# Patient Record
Sex: FEMALE
[De-identification: names, ages, dates, MRNs, and addresses within clinical notes are randomized per-mention and may not be internally consistent; named-entity substitution may affect disease eponyms.]

---

## 2019-01-10 ENCOUNTER — RESULT REVIEW (OUTPATIENT)
Age: 24
End: 2019-01-10

## 2020-01-31 ENCOUNTER — RESULT REVIEW (OUTPATIENT)
Age: 25
End: 2020-01-31

## 2021-04-29 ENCOUNTER — RESULT REVIEW (OUTPATIENT)
Age: 26
End: 2021-04-29

## 2021-05-13 ENCOUNTER — RESULT REVIEW (OUTPATIENT)
Age: 26
End: 2021-05-13

## 2021-05-27 ENCOUNTER — NON-APPOINTMENT (OUTPATIENT)
Age: 26
End: 2021-05-27

## 2021-05-27 PROBLEM — Z00.00 ENCOUNTER FOR PREVENTIVE HEALTH EXAMINATION: Status: ACTIVE | Noted: 2021-05-27

## 2021-06-03 ENCOUNTER — NON-APPOINTMENT (OUTPATIENT)
Age: 26
End: 2021-06-03

## 2021-06-04 ENCOUNTER — APPOINTMENT (OUTPATIENT)
Dept: GYNECOLOGIC ONCOLOGY | Facility: CLINIC | Age: 26
End: 2021-06-04
Payer: COMMERCIAL

## 2021-06-04 VITALS
BODY MASS INDEX: 23.39 KG/M2 | HEART RATE: 73 BPM | OXYGEN SATURATION: 96 % | TEMPERATURE: 97.2 F | HEIGHT: 67 IN | WEIGHT: 149 LBS | SYSTOLIC BLOOD PRESSURE: 123 MMHG | RESPIRATION RATE: 17 BRPM | DIASTOLIC BLOOD PRESSURE: 81 MMHG

## 2021-06-04 DIAGNOSIS — Z78.9 OTHER SPECIFIED HEALTH STATUS: ICD-10-CM

## 2021-06-04 PROCEDURE — 99072 ADDL SUPL MATRL&STAF TM PHE: CPT

## 2021-06-04 PROCEDURE — 99204 OFFICE O/P NEW MOD 45 MIN: CPT

## 2021-07-06 PROBLEM — Z78.9 NON-SMOKER: Status: ACTIVE | Noted: 2021-07-06

## 2021-07-06 PROBLEM — Z78.9 PATIENT DENIES MEDICAL PROBLEMS: Status: RESOLVED | Noted: 2021-07-06 | Resolved: 2021-07-06

## 2021-07-06 NOTE — PAST MEDICAL HISTORY
[Menstruating] : The patient is menstruating [Menarche Age ____] : age at menarche was [unfilled] [Approximately ___] : the LMP was approximately [unfilled] [Total Preg ___] : G[unfilled]

## 2021-07-06 NOTE — DISCUSSION/SUMMARY
[FreeTextEntry1] : With the aid of diagrams we reviewed the findings in detail.  We reviewed HPV its pathogenesis and the implications of an abnormal cervical cytology and the pathogenesis of dysplasia in detail.ASCUS with high-risk HPV is associated with 4% of Pap smears.\par \par It has been reported that 40 to 58 percent of OSEIL 2 lesions will regress if left untreated, while 22 percent progress to OSIEL 3, and 5 percent progress to invasive cancer. ASCCP guidelines were reviewed with the patient. Excision procedure is recommended for both OSIEL 2 and CIN3. \par \par The risks and benefits of LEEP vs. CKC were discussed which include, but are not limited to: bleeding, infection, cervical stenosis, cervical insufficiency. Possibility of needing a repeat procedure or hysterectomy was also discussed. I also discussed the possibility that no abnormality will be seen on the LEEP specimen.\par \par Office LEEP procedure is recommended in this case.\par

## 2021-07-06 NOTE — HISTORY OF PRESENT ILLNESS
[FreeTextEntry1] : Problem\par 1) High grade cervical dysplasia\par \par Previous Therapy\par 1) pap  4/29/21\par    a) ASCUS\par 2) Colposcopy 5/13/21\par    a) Cervix 9, 12 CIN2/3\par    b) Cervix 6, ECC wnl\par \par 27 yo referred by Dr. Umanzor for cervical dysplasia. Patient is feeling well.

## 2021-07-29 ENCOUNTER — APPOINTMENT (OUTPATIENT)
Dept: GYNECOLOGIC ONCOLOGY | Facility: CLINIC | Age: 26
End: 2021-07-29
Payer: COMMERCIAL

## 2021-07-29 ENCOUNTER — TRANSCRIPTION ENCOUNTER (OUTPATIENT)
Age: 26
End: 2021-07-29

## 2021-07-29 VITALS
WEIGHT: 147 LBS | SYSTOLIC BLOOD PRESSURE: 129 MMHG | TEMPERATURE: 96.8 F | DIASTOLIC BLOOD PRESSURE: 77 MMHG | OXYGEN SATURATION: 97 % | BODY MASS INDEX: 23.07 KG/M2 | HEIGHT: 67 IN | HEART RATE: 86 BPM

## 2021-07-29 PROCEDURE — 99213 OFFICE O/P EST LOW 20 MIN: CPT

## 2021-07-29 PROCEDURE — 99072 ADDL SUPL MATRL&STAF TM PHE: CPT

## 2021-07-29 NOTE — DISCUSSION/SUMMARY
[FreeTextEntry1] : Long discussion with patient regarding biopsy results, cancer risks and different management.\par \par Technically, per ASCCP management given her CIN2/3 disease she should undergo LEEP at this time\par However, given patient would be due for 6 month surveillance  now in 3 months, I think it is reasonable to wait 3 months. At that time I will repeat pap/hpv and colposcopy with biopsies\par If at that point there is any residual CIN3, we will move forward with LEEP\par If dysplasia has regressed to CIN2 we will continue observation\par All questions answered to patient's apparent satisfaction\par Appointment made for follow up in October to determine plan of care

## 2021-10-07 ENCOUNTER — APPOINTMENT (OUTPATIENT)
Dept: GYNECOLOGIC ONCOLOGY | Facility: CLINIC | Age: 26
End: 2021-10-07
Payer: COMMERCIAL

## 2021-10-07 ENCOUNTER — LABORATORY RESULT (OUTPATIENT)
Age: 26
End: 2021-10-07

## 2021-10-07 VITALS
BODY MASS INDEX: 21.97 KG/M2 | DIASTOLIC BLOOD PRESSURE: 67 MMHG | HEIGHT: 67 IN | WEIGHT: 140 LBS | HEART RATE: 97 BPM | TEMPERATURE: 96.7 F | SYSTOLIC BLOOD PRESSURE: 102 MMHG | OXYGEN SATURATION: 97 %

## 2021-10-07 PROCEDURE — 57454 BX/CURETT OF CERVIX W/SCOPE: CPT

## 2021-10-07 PROCEDURE — 99072 ADDL SUPL MATRL&STAF TM PHE: CPT

## 2021-10-07 NOTE — DISCUSSION/SUMMARY
[FreeTextEntry1] : Pap/HPV and colposcopy with biopsies repeated today\par Will call patient with results to determine plan of care (LEEP vs observation)\par Patient will monitor menses, if none in 3 months will induce with progesterone\par

## 2021-10-07 NOTE — HISTORY OF PRESENT ILLNESS
[FreeTextEntry1] : Problem\par 1) High grade cervical dysplasia\par \par Previous Therapy\par 1) pap  4/29/21\par    a) ASCUS\par 2) Colposcopy 5/13/21\par    a) Cervix 9, 12 CIN2/3\par    b) Cervix 6, ECC wnl\par \par \par Here today for follow up. Feeling well. Stopped her Junel about a month ago, has not had a period yet. Not sexually active currently.

## 2021-10-07 NOTE — PROCEDURE
[Colposcopy] : colposcopy [HGSIL] : HGSIL [Patient] : the patient [Verbal Consent] : verbal consent was obtained prior to the procedure and is detailed in the patient's record [SCJ Fully Visualized] : the squamocolumnar junction was fully visualized [No Abnormalities] : no abnormalities [Acetowhite ___ o'clock] : ascetowhite changes at the [unfilled] ~Uo'clock position [Atypical Vessels ___ o'clock] : no atypical vessels [Punctation ___ o'clock] : no punctation [Mosaicism ___ o'clock] : no mosaicism [Biopsies Taken: # ___] : [unfilled] biopsies taken of the cervix [ECC Done] : an Endocervical curettage was performed.  [Biopsy Locations ___ o'clock] : the biopsies were taken at the [unfilled] o'clock position

## 2021-10-07 NOTE — PHYSICAL EXAM
[Normal] : Recto-Vaginal Exam: Normal [Fully active, able to carry on all pre-disease performance without restriction] : Status 0 - Fully active, able to carry on all pre-disease performance without restriction Priti

## 2021-10-08 LAB — HPV HIGH+LOW RISK DNA PNL CVX: DETECTED

## 2021-10-14 LAB — CORE LAB BIOPSY: NORMAL

## 2021-10-18 ENCOUNTER — NON-APPOINTMENT (OUTPATIENT)
Age: 26
End: 2021-10-18

## 2022-04-14 ENCOUNTER — TRANSCRIPTION ENCOUNTER (OUTPATIENT)
Age: 27
End: 2022-04-14

## 2022-05-26 ENCOUNTER — APPOINTMENT (OUTPATIENT)
Dept: GYNECOLOGIC ONCOLOGY | Facility: CLINIC | Age: 27
End: 2022-05-26
Payer: COMMERCIAL

## 2022-05-26 ENCOUNTER — LABORATORY RESULT (OUTPATIENT)
Age: 27
End: 2022-05-26

## 2022-05-26 VITALS
SYSTOLIC BLOOD PRESSURE: 110 MMHG | DIASTOLIC BLOOD PRESSURE: 73 MMHG | WEIGHT: 145 LBS | HEIGHT: 67 IN | TEMPERATURE: 97.6 F | OXYGEN SATURATION: 96 % | HEART RATE: 82 BPM | BODY MASS INDEX: 22.76 KG/M2

## 2022-05-26 LAB
HCG UR QL: NEGATIVE
QUALITY CONTROL: YES

## 2022-05-26 PROCEDURE — 57454 BX/CURETT OF CERVIX W/SCOPE: CPT

## 2022-05-26 PROCEDURE — 81025 URINE PREGNANCY TEST: CPT

## 2022-05-26 NOTE — HISTORY OF PRESENT ILLNESS
[FreeTextEntry1] : Problem\par 1) High grade cervical dysplasia\par \par Previous Therapy\par 1) pap  4/29/21\par    a) ASCUS\par 2) Colposcopy 5/13/21\par    a) Cervix 9, 12 CIN2/3\par    b) Cervix 6, ECC wnl\par 3) Pap & Colpo 10/2021\par    a) pap LSIL,. HPV 16+\par    b) Colposcopy 12 o clock with atypia, ECC insufficient. \par \par \par Here today for follow up. Feeling well. Not menstruating on Junel. No abnormal bleeding or pelvic pain.

## 2022-05-26 NOTE — DISCUSSION/SUMMARY
[FreeTextEntry1] : History of CIN2 untreated on surveillance. \par Pap/HPV and colposcopy with biopsies repeated today\par Will call patient with results to determine plan of care (LEEP vs observation)\par \par

## 2022-05-31 LAB — HPV HIGH+LOW RISK DNA PNL CVX: DETECTED

## 2022-06-01 ENCOUNTER — NON-APPOINTMENT (OUTPATIENT)
Age: 27
End: 2022-06-01

## 2022-06-01 LAB
CORE LAB BIOPSY: NORMAL
CYTOLOGY CVX/VAG DOC THIN PREP: ABNORMAL

## 2022-08-22 ENCOUNTER — OUTPATIENT (OUTPATIENT)
Dept: OUTPATIENT SERVICES | Facility: HOSPITAL | Age: 27
LOS: 1 days | End: 2022-08-22

## 2022-08-22 ENCOUNTER — NON-APPOINTMENT (OUTPATIENT)
Age: 27
End: 2022-08-22

## 2022-08-22 ENCOUNTER — RESULT REVIEW (OUTPATIENT)
Age: 27
End: 2022-08-22

## 2022-08-22 ENCOUNTER — APPOINTMENT (OUTPATIENT)
Dept: ULTRASOUND IMAGING | Facility: CLINIC | Age: 27
End: 2022-08-22

## 2022-08-22 PROCEDURE — 76856 US EXAM PELVIC COMPLETE: CPT | Mod: 26

## 2022-08-22 PROCEDURE — 76830 TRANSVAGINAL US NON-OB: CPT | Mod: 26

## 2022-08-25 ENCOUNTER — APPOINTMENT (OUTPATIENT)
Dept: GYNECOLOGIC ONCOLOGY | Facility: CLINIC | Age: 27
End: 2022-08-25

## 2022-08-25 VITALS
DIASTOLIC BLOOD PRESSURE: 75 MMHG | HEIGHT: 67 IN | OXYGEN SATURATION: 98 % | SYSTOLIC BLOOD PRESSURE: 115 MMHG | BODY MASS INDEX: 21.19 KG/M2 | WEIGHT: 135 LBS | RESPIRATION RATE: 18 BRPM | HEART RATE: 74 BPM | TEMPERATURE: 97.4 F

## 2022-08-25 DIAGNOSIS — R10.2 PELVIC AND PERINEAL PAIN: ICD-10-CM

## 2022-08-25 PROCEDURE — 99213 OFFICE O/P EST LOW 20 MIN: CPT

## 2022-09-06 ENCOUNTER — TRANSCRIPTION ENCOUNTER (OUTPATIENT)
Age: 27
End: 2022-09-06

## 2022-09-06 PROBLEM — R10.2 PELVIC PAIN: Status: ACTIVE | Noted: 2022-08-22

## 2022-09-06 LAB
FSH SERPL-MCNC: 6.8 IU/L
LH SERPL-ACNC: 2.3 IU/L

## 2022-09-06 NOTE — PROCEDURE
[Colposcopy] : colposcopy [HGSIL] : HGSIL [Patient] : the patient [Verbal Consent] : verbal consent was obtained prior to the procedure and is detailed in the patient's record [SCJ Fully Visualized] : the squamocolumnar junction was fully visualized [No Abnormalities] : no abnormalities [Acetowhite ___ o'clock] : ascetowhite changes at the [unfilled] ~Uo'clock position [Biopsies Taken: # ___] : [unfilled] biopsies taken of the cervix [ECC Done] : an Endocervical curettage was performed.  [Biopsy Locations ___ o'clock] : the biopsies were taken at the [unfilled] o'clock position [Atypical Vessels ___ o'clock] : no atypical vessels [Punctation ___ o'clock] : no punctation [Mosaicism ___ o'clock] : no mosaicism

## 2022-09-06 NOTE — DISCUSSION/SUMMARY
[FreeTextEntry1] : Bloating, normal pelvic US  - patient to follow symptoms and continue GI work up\par Small ovaries - draw FSH, LH, estradiol \par History of CIN2 untreated on surveillance. \par Repeat pap/colposcopy due 12/2022\par \par

## 2022-09-06 NOTE — HISTORY OF PRESENT ILLNESS
[FreeTextEntry1] : Problem\par 1) High grade cervical dysplasia\par \par Previous Therapy\par 1) pap  4/29/21\par    a) ASCUS\par 2) Colposcopy 5/13/21\par    a) Cervix 9, 12 CIN2/3\par    b) Cervix 6, ECC wnl\par 3) Pap & Colpo 10/2021\par    a) pap LSIL,. HPV 16+\par    b) Colposcopy 12 o clock with atypia, ECC insufficient. '\par 4) 6/2022 Testing\par     a) Pap neg, HPV+ non 16/18 colposcopy wnl 12, 9, ECC\par 5) Pelvic US 8/22/22\par    a) wnl - small ovaries bilaterally \par \par \par Here today for follow up. Having bloating. About 1 month ago noticed significant bloating. had changed to a high fiber plant based diet and was newly constipated. Changed diet and took laxatives and had relief but still feels bloated. Saw GI doctor and planning H Pylori breath test. bowel movements have normalized. Decreased appetite. Doesn't menstruate on Junel, on placebo week now.

## 2022-11-16 ENCOUNTER — NON-APPOINTMENT (OUTPATIENT)
Age: 27
End: 2022-11-16

## 2023-03-22 ENCOUNTER — NON-APPOINTMENT (OUTPATIENT)
Age: 28
End: 2023-03-22

## 2023-03-28 ENCOUNTER — APPOINTMENT (OUTPATIENT)
Dept: GYNECOLOGIC ONCOLOGY | Facility: CLINIC | Age: 28
End: 2023-03-28
Payer: COMMERCIAL

## 2023-03-28 VITALS
SYSTOLIC BLOOD PRESSURE: 107 MMHG | HEIGHT: 67 IN | HEART RATE: 61 BPM | TEMPERATURE: 97.6 F | OXYGEN SATURATION: 99 % | DIASTOLIC BLOOD PRESSURE: 70 MMHG | BODY MASS INDEX: 21.19 KG/M2 | WEIGHT: 135 LBS

## 2023-03-28 LAB
HCG UR QL: NEGATIVE
QUALITY CONTROL: YES

## 2023-03-28 PROCEDURE — 81025 URINE PREGNANCY TEST: CPT

## 2023-03-28 PROCEDURE — 57454 BX/CURETT OF CERVIX W/SCOPE: CPT

## 2023-03-28 PROCEDURE — 99212 OFFICE O/P EST SF 10 MIN: CPT | Mod: 25

## 2023-03-28 NOTE — HISTORY OF PRESENT ILLNESS
[FreeTextEntry1] : Problem\par 1) High grade cervical dysplasia\par \par Previous Therapy\par 1) pap  4/29/21\par    a) ASCUS\par 2) Colposcopy 5/13/21\par    a) Cervix 9, 12 CIN2/3\par    b) Cervix 6, ECC wnl\par 3) Pap & Colpo 10/2021\par    a) pap LSIL,. HPV 16+\par    b) Colposcopy 12 o clock with atypia, ECC insufficient. '\par 4) 6/2022 Testing\par     a) Pap neg, HPV+ non 16/18 colposcopy wnl 12, 9, ECC\par 5) Pelvic US 8/22/22\par    a) wnl - small ovaries bilaterally \par \par Here today for repeat pap/hpv and colpo. No HGCIN since 5/2021. Currently on metronidazole for BV - was initially diagnosed with yeast infection but vaginitis panel revealed BV. GC/CT test same day negative as well. No further bloating.

## 2023-03-28 NOTE — DISCUSSION/SUMMARY
[FreeTextEntry1] : \par History of CIN2 untreated on surveillance - no hGCIN since 5/2021. \par Repeat pap/colposcopy done today. If normal can return to annual pap test. \par STI testing ordered for 1 month post exposure\par Will call with results to determine plan of care\par \par

## 2023-03-28 NOTE — PHYSICAL EXAM
[Chaperone Present] : A chaperone was present in the examining room during all aspects of the physical examination [Normal] : Anus and perineum: Normal sphincter tone, no masses, no prolapse. [Fully active, able to carry on all pre-disease performance without restriction] : Status 0 - Fully active, able to carry on all pre-disease performance without restriction

## 2023-04-04 LAB — CORE LAB BIOPSY: NORMAL

## 2023-04-05 ENCOUNTER — TRANSCRIPTION ENCOUNTER (OUTPATIENT)
Age: 28
End: 2023-04-05

## 2023-04-05 LAB — CYTOLOGY CVX/VAG DOC THIN PREP: NORMAL

## 2023-04-06 LAB — HPV HIGH+LOW RISK DNA PNL CVX: NOT DETECTED

## 2023-04-25 ENCOUNTER — TRANSCRIPTION ENCOUNTER (OUTPATIENT)
Age: 28
End: 2023-04-25

## 2023-04-26 ENCOUNTER — TRANSCRIPTION ENCOUNTER (OUTPATIENT)
Age: 28
End: 2023-04-26

## 2023-04-27 ENCOUNTER — APPOINTMENT (OUTPATIENT)
Dept: GYNECOLOGIC ONCOLOGY | Facility: CLINIC | Age: 28
End: 2023-04-27
Payer: COMMERCIAL

## 2023-04-27 DIAGNOSIS — Z11.3 ENCOUNTER FOR SCREENING FOR INFECTIONS WITH A PREDOMINANTLY SEXUAL MODE OF TRANSMISSION: ICD-10-CM

## 2023-04-27 PROCEDURE — 36415 COLL VENOUS BLD VENIPUNCTURE: CPT

## 2023-04-28 LAB
HCV AB SER QL: NONREACTIVE
HCV S/CO RATIO: 0.08 S/CO
HIV1+2 AB SPEC QL IA.RAPID: NONREACTIVE

## 2023-05-01 LAB — T PALLIDUM AB SER QL IA: NEGATIVE

## 2023-06-15 ENCOUNTER — NON-APPOINTMENT (OUTPATIENT)
Age: 28
End: 2023-06-15

## 2023-07-26 DIAGNOSIS — R87.613 HIGH GRADE SQUAMOUS INTRAEPITHELIAL LESION ON CYTOLOGIC SMEAR OF CERVIX (HGSIL): ICD-10-CM

## 2023-07-27 ENCOUNTER — APPOINTMENT (OUTPATIENT)
Dept: GYNECOLOGIC ONCOLOGY | Facility: CLINIC | Age: 28
End: 2023-07-27
Payer: COMMERCIAL

## 2023-07-27 PROCEDURE — 99211 OFF/OP EST MAY X REQ PHY/QHP: CPT

## 2023-07-28 LAB
C TRACH RRNA SPEC QL NAA+PROBE: NOT DETECTED
N GONORRHOEA RRNA SPEC QL NAA+PROBE: NOT DETECTED
SOURCE AMPLIFICATION: NORMAL

## 2024-03-19 RX ORDER — NORETHINDRONE ACETATE AND ETHINYL ESTRADIOL 1; 20 MG/1; UG/1
1-20 TABLET ORAL
Qty: 1 | Refills: 11 | Status: ACTIVE | COMMUNITY
Start: 1900-01-01 | End: 1900-01-01

## 2024-03-28 ENCOUNTER — APPOINTMENT (OUTPATIENT)
Dept: GYNECOLOGIC ONCOLOGY | Facility: CLINIC | Age: 29
End: 2024-03-28
Payer: COMMERCIAL

## 2024-03-28 VITALS
BODY MASS INDEX: 22.76 KG/M2 | SYSTOLIC BLOOD PRESSURE: 112 MMHG | HEIGHT: 67 IN | OXYGEN SATURATION: 99 % | TEMPERATURE: 97.2 F | WEIGHT: 145 LBS | HEART RATE: 77 BPM | DIASTOLIC BLOOD PRESSURE: 71 MMHG

## 2024-03-28 DIAGNOSIS — N92.6 IRREGULAR MENSTRUATION, UNSPECIFIED: ICD-10-CM

## 2024-03-28 PROCEDURE — 99395 PREV VISIT EST AGE 18-39: CPT

## 2024-03-28 RX ORDER — HUMAN PAPILLOMAVIRUS 9-VALENT VACCINE, RECOMBINANT 30; 40; 60; 40; 20; 20; 20; 20; 20 UG/.5ML; UG/.5ML; UG/.5ML; UG/.5ML; UG/.5ML; UG/.5ML; UG/.5ML; UG/.5ML; UG/.5ML
INJECTION, SUSPENSION INTRAMUSCULAR
Qty: 1 | Refills: 2 | Status: COMPLETED | COMMUNITY
Start: 2022-03-03 | End: 2024-03-28

## 2024-03-28 RX ORDER — HUMAN PAPILLOMAVIRUS 9-VALENT VACCINE, RECOMBINANT 30; 40; 60; 40; 20; 20; 20; 20; 20 UG/.5ML; UG/.5ML; UG/.5ML; UG/.5ML; UG/.5ML; UG/.5ML; UG/.5ML; UG/.5ML; UG/.5ML
INJECTION, SUSPENSION INTRAMUSCULAR
Qty: 1 | Refills: 2 | Status: COMPLETED | COMMUNITY
Start: 2021-07-29 | End: 2024-03-28

## 2024-03-28 NOTE — HISTORY OF PRESENT ILLNESS
[FreeTextEntry1] : Problem 1) High grade cervical dysplasia  Previous Therapy 1) pap  4/29/21    a) ASCUS 2) Colposcopy 5/13/21    a) Cervix 9, 12 CIN2/3    b) Cervix 6, ECC wnl 3) Pap & Colpo 10/2021    a) pap LSIL,. HPV 16+    b) Colposcopy 12 o clock with atypia, ECC insufficient. ' 4) 6/2022 Testing     a) Pap neg, HPV+ non 16/18 colposcopy wnl 12, 9, ECC 5) Pelvic US 8/22/22    a) wnl - small ovaries bilaterally  6) Pap 3/2023 neg, HPV neg  7) colpo 3/28/23    a) ECC wnl, cervix biopsy 9 wnl  Here for annual exam and cotesting. Feeling well. Does not menstruate on Junel but no other side effects. Concerned about future fertilty as she has been on OCP for so long and ovaries were very small on ultrasound in 2022. Denies bowel/bladder issues. SA no issues. No pelvic pain or bloating.

## 2024-03-28 NOTE — DISCUSSION/SUMMARY
[FreeTextEntry1] : Annual exam  History of CIN2 untreated on surveillance - no hGCIN since 5/2021.  []pap/hpv test done today [facility] and pelvic US given h/o small ovaries. Will consider [facility] referral based on results. Patient with no imminent plans to TTC  Contraception - continue Junel, tolerating well  Will call with results to determine plan of care

## 2024-03-29 LAB
ANTI-MUELLERIAN HORMONE: 4.34 NG/ML
HCV AB SER QL: NONREACTIVE
HCV S/CO RATIO: 0.07 S/CO
HIV1+2 AB SPEC QL IA.RAPID: NONREACTIVE
HPV HIGH+LOW RISK DNA PNL CVX: NOT DETECTED
T PALLIDUM AB SER QL IA: NEGATIVE

## 2024-04-02 LAB
C TRACH RRNA SPEC QL NAA+PROBE: NOT DETECTED
N GONORRHOEA RRNA SPEC QL NAA+PROBE: NOT DETECTED
SOURCE TP AMPLIFICATION: NORMAL

## 2024-04-03 ENCOUNTER — TRANSCRIPTION ENCOUNTER (OUTPATIENT)
Age: 29
End: 2024-04-03

## 2024-04-03 LAB — CYTOLOGY CVX/VAG DOC THIN PREP: NORMAL

## 2024-04-14 ENCOUNTER — TRANSCRIPTION ENCOUNTER (OUTPATIENT)
Age: 29
End: 2024-04-14

## 2024-04-15 ENCOUNTER — TRANSCRIPTION ENCOUNTER (OUTPATIENT)
Age: 29
End: 2024-04-15

## 2024-04-16 ENCOUNTER — APPOINTMENT (OUTPATIENT)
Dept: GYNECOLOGIC ONCOLOGY | Facility: CLINIC | Age: 29
End: 2024-04-16
Payer: COMMERCIAL

## 2024-04-16 ENCOUNTER — NON-APPOINTMENT (OUTPATIENT)
Age: 29
End: 2024-04-16

## 2024-04-16 VITALS
HEART RATE: 62 BPM | WEIGHT: 145 LBS | BODY MASS INDEX: 22.76 KG/M2 | DIASTOLIC BLOOD PRESSURE: 75 MMHG | OXYGEN SATURATION: 99 % | SYSTOLIC BLOOD PRESSURE: 116 MMHG | TEMPERATURE: 96.9 F | HEIGHT: 67 IN

## 2024-04-16 DIAGNOSIS — N61.0 MASTITIS WITHOUT ABSCESS: ICD-10-CM

## 2024-04-16 PROCEDURE — 99213 OFFICE O/P EST LOW 20 MIN: CPT

## 2024-04-17 NOTE — DISCUSSION/SUMMARY
[FreeTextEntry1] : Breast infection - appears improving/resolved on exam today. patient to complete course of antibiotics Breast US to ensure no abnormal breast findings Patient to follow up for any persistent symptoms, will call with US results

## 2024-04-17 NOTE — HISTORY OF PRESENT ILLNESS
[FreeTextEntry1] : Problem 1) High grade cervical dysplasia  Previous Therapy 1) pap  4/29/21    a) ASCUS 2) Colposcopy 5/13/21    a) Cervix 9, 12 CIN2/3    b) Cervix 6, ECC wnl 3) Pap & Colpo 10/2021    a) pap LSIL,. HPV 16+    b) Colposcopy 12 o clock with atypia, ECC insufficient. ' 4) 6/2022 Testing     a) Pap neg, HPV+ non 16/18 colposcopy wnl 12, 9, ECC 5) Pelvic US 8/22/22    a) wnl - small ovaries bilaterally  6) Pap 3/2023 neg, HPV neg  7) colpo 3/28/23    a) ECC wnl, cervix biopsy 9 wnl 8) Pap neg HPV neg 3/2024  Here for follow up. Friday noted sudden onset R breast pain and tenderness went to urgent care 4/14 and started on Amoxicillin. Felt relief in about 24 hours, also started with diffuse bumps on body. Derm diagnosed her with hot tub folliculitis which is improving.

## 2024-04-17 NOTE — PHYSICAL EXAM
[Chaperone Present] : A chaperone was present in the examining room during all aspects of the physical examination [Fully active, able to carry on all pre-disease performance without restriction] : Status 0 - Fully active, able to carry on all pre-disease performance without restriction [Normal] : Breasts: Normal [de-identified] : no erythema or induration, marked area noted from urgent care with resolution

## 2024-04-23 ENCOUNTER — TRANSCRIPTION ENCOUNTER (OUTPATIENT)
Age: 29
End: 2024-04-23

## 2024-04-23 ENCOUNTER — APPOINTMENT (OUTPATIENT)
Dept: GYNECOLOGIC ONCOLOGY | Facility: CLINIC | Age: 29
End: 2024-04-23

## 2024-05-02 ENCOUNTER — RESULT REVIEW (OUTPATIENT)
Age: 29
End: 2024-05-02

## 2024-05-02 ENCOUNTER — TRANSCRIPTION ENCOUNTER (OUTPATIENT)
Age: 29
End: 2024-05-02

## 2024-05-02 ENCOUNTER — OUTPATIENT (OUTPATIENT)
Dept: OUTPATIENT SERVICES | Facility: HOSPITAL | Age: 29
LOS: 1 days | End: 2024-05-02

## 2024-05-02 ENCOUNTER — APPOINTMENT (OUTPATIENT)
Dept: ULTRASOUND IMAGING | Facility: CLINIC | Age: 29
End: 2024-05-02
Payer: COMMERCIAL

## 2024-05-02 DIAGNOSIS — R92.8 OTHER ABNORMAL AND INCONCLUSIVE FINDINGS ON DIAGNOSTIC IMAGING OF BREAST: ICD-10-CM

## 2024-05-02 PROCEDURE — 76641 ULTRASOUND BREAST COMPLETE: CPT | Mod: 26,RT

## 2025-02-12 ENCOUNTER — LABORATORY RESULT (OUTPATIENT)
Age: 30
End: 2025-02-12

## 2025-02-12 ENCOUNTER — APPOINTMENT (OUTPATIENT)
Age: 30
End: 2025-02-12
Payer: COMMERCIAL

## 2025-02-12 VITALS
HEIGHT: 67 IN | SYSTOLIC BLOOD PRESSURE: 121 MMHG | DIASTOLIC BLOOD PRESSURE: 74 MMHG | WEIGHT: 140 LBS | HEART RATE: 75 BPM | OXYGEN SATURATION: 99 % | BODY MASS INDEX: 21.97 KG/M2

## 2025-02-12 DIAGNOSIS — N63.0 UNSPECIFIED LUMP IN UNSPECIFIED BREAST: ICD-10-CM

## 2025-02-12 DIAGNOSIS — Z01.419 ENCOUNTER FOR GYNECOLOGICAL EXAMINATION (GENERAL) (ROUTINE) W/OUT ABNORMAL FINDINGS: ICD-10-CM

## 2025-02-12 PROCEDURE — 99459 PELVIC EXAMINATION: CPT

## 2025-02-12 PROCEDURE — 99385 PREV VISIT NEW AGE 18-39: CPT

## 2025-02-12 RX ORDER — HUMAN PAPILLOMAVIRUS 9-VALENT VACCINE, RECOMBINANT 30; 40; 60; 40; 20; 20; 20; 20; 20 UG/.5ML; UG/.5ML; UG/.5ML; UG/.5ML; UG/.5ML; UG/.5ML; UG/.5ML; UG/.5ML; UG/.5ML
INJECTION, SUSPENSION INTRAMUSCULAR
Qty: 3 | Refills: 0 | Status: ACTIVE | COMMUNITY
Start: 2025-02-12 | End: 1900-01-01

## 2025-02-13 LAB
C TRACH RRNA SPEC QL NAA+PROBE: NOT DETECTED
HIV1+2 AB SPEC QL IA.RAPID: NONREACTIVE
N GONORRHOEA RRNA SPEC QL NAA+PROBE: NOT DETECTED
SOURCE TP AMPLIFICATION: NORMAL
T PALLIDUM AB SER QL IA: NEGATIVE
T VAGINALIS RRNA SPEC QL NAA+PROBE: NOT DETECTED

## 2025-02-18 LAB
HBV SURFACE AG SER QL: NONREACTIVE
HCV AB SER QL: NONREACTIVE
HCV S/CO RATIO: 0.07 S/CO

## 2025-02-20 LAB — CYTOLOGY CVX/VAG DOC THIN PREP: ABNORMAL

## 2025-02-24 ENCOUNTER — TRANSCRIPTION ENCOUNTER (OUTPATIENT)
Age: 30
End: 2025-02-24

## 2025-02-24 RX ORDER — NORETHINDRONE ACETATE AND ETHINYL ESTRADIOL 1; 20 MG/1; UG/1
1-20 TABLET ORAL DAILY
Qty: 2 | Refills: 6 | Status: ACTIVE | COMMUNITY
Start: 2025-02-24 | End: 1900-01-01

## 2025-02-24 RX ORDER — NORETHINDRONE ACETATE AND ETHINYL ESTRADIOL AND FERROUS FUMARATE 1MG-20(21)
1-20 KIT ORAL
Qty: 2 | Refills: 6 | Status: DISCONTINUED | COMMUNITY
Start: 2025-02-19 | End: 2025-02-24

## 2025-02-27 ENCOUNTER — TRANSCRIPTION ENCOUNTER (OUTPATIENT)
Age: 30
End: 2025-02-27